# Patient Record
Sex: FEMALE | Race: BLACK OR AFRICAN AMERICAN | NOT HISPANIC OR LATINO
[De-identification: names, ages, dates, MRNs, and addresses within clinical notes are randomized per-mention and may not be internally consistent; named-entity substitution may affect disease eponyms.]

---

## 2018-03-14 ENCOUNTER — APPOINTMENT (OUTPATIENT)
Dept: MRI IMAGING | Facility: IMAGING CENTER | Age: 43
End: 2018-03-14
Payer: MEDICAID

## 2018-03-14 ENCOUNTER — OUTPATIENT (OUTPATIENT)
Dept: OUTPATIENT SERVICES | Facility: HOSPITAL | Age: 43
LOS: 1 days | End: 2018-03-14
Payer: MEDICAID

## 2018-03-14 DIAGNOSIS — Z00.8 ENCOUNTER FOR OTHER GENERAL EXAMINATION: ICD-10-CM

## 2018-03-14 PROCEDURE — 72197 MRI PELVIS W/O & W/DYE: CPT | Mod: 26

## 2018-03-14 PROCEDURE — 74183 MRI ABD W/O CNTR FLWD CNTR: CPT

## 2018-03-14 PROCEDURE — 82565 ASSAY OF CREATININE: CPT

## 2018-03-14 PROCEDURE — 74183 MRI ABD W/O CNTR FLWD CNTR: CPT | Mod: 26

## 2018-03-14 PROCEDURE — A9585: CPT

## 2018-03-14 PROCEDURE — 72197 MRI PELVIS W/O & W/DYE: CPT

## 2020-10-13 ENCOUNTER — EMERGENCY (EMERGENCY)
Facility: HOSPITAL | Age: 45
LOS: 1 days | Discharge: HOME | End: 2020-10-13
Attending: EMERGENCY MEDICINE
Payer: COMMERCIAL

## 2020-10-13 VITALS
HEART RATE: 89 BPM | DIASTOLIC BLOOD PRESSURE: 81 MMHG | OXYGEN SATURATION: 100 % | RESPIRATION RATE: 18 BRPM | HEIGHT: 67 IN | WEIGHT: 179.02 LBS | TEMPERATURE: 98 F | SYSTOLIC BLOOD PRESSURE: 145 MMHG

## 2020-10-13 LAB
ALBUMIN SERPL ELPH-MCNC: 4.4 G/DL — SIGNIFICANT CHANGE UP (ref 3.3–5)
ALP SERPL-CCNC: 55 U/L — SIGNIFICANT CHANGE UP (ref 40–120)
ALT FLD-CCNC: 12 U/L — SIGNIFICANT CHANGE UP (ref 10–45)
ANION GAP SERPL CALC-SCNC: 10 MMOL/L — SIGNIFICANT CHANGE UP (ref 5–17)
AST SERPL-CCNC: 19 U/L — SIGNIFICANT CHANGE UP (ref 10–40)
BASE EXCESS BLDV CALC-SCNC: 2.7 MMOL/L — HIGH (ref -2–2)
BILIRUB SERPL-MCNC: 0.2 MG/DL — SIGNIFICANT CHANGE UP (ref 0.2–1.2)
BLD GP AB SCN SERPL QL: NEGATIVE — SIGNIFICANT CHANGE UP
BUN SERPL-MCNC: 9 MG/DL — SIGNIFICANT CHANGE UP (ref 7–23)
CA-I SERPL-SCNC: 1.23 MMOL/L — SIGNIFICANT CHANGE UP (ref 1.12–1.3)
CALCIUM SERPL-MCNC: 9.4 MG/DL — SIGNIFICANT CHANGE UP (ref 8.4–10.5)
CHLORIDE BLDV-SCNC: 103 MMOL/L — SIGNIFICANT CHANGE UP (ref 96–108)
CHLORIDE SERPL-SCNC: 104 MMOL/L — SIGNIFICANT CHANGE UP (ref 96–108)
CO2 BLDV-SCNC: 29 MMOL/L — SIGNIFICANT CHANGE UP (ref 22–30)
CO2 SERPL-SCNC: 25 MMOL/L — SIGNIFICANT CHANGE UP (ref 22–31)
CREAT SERPL-MCNC: 0.7 MG/DL — SIGNIFICANT CHANGE UP (ref 0.5–1.3)
GAS PNL BLDV: 139 MMOL/L — SIGNIFICANT CHANGE UP (ref 135–145)
GAS PNL BLDV: SIGNIFICANT CHANGE UP
GAS PNL BLDV: SIGNIFICANT CHANGE UP
GLUCOSE BLDV-MCNC: 94 MG/DL — SIGNIFICANT CHANGE UP (ref 70–99)
GLUCOSE SERPL-MCNC: 97 MG/DL — SIGNIFICANT CHANGE UP (ref 70–99)
HCG SERPL-ACNC: <2 MIU/ML — SIGNIFICANT CHANGE UP
HCO3 BLDV-SCNC: 28 MMOL/L — SIGNIFICANT CHANGE UP (ref 21–29)
HCT VFR BLD CALC: 22.7 % — LOW (ref 34.5–45)
HCT VFR BLDA CALC: 20 % — CRITICAL LOW (ref 39–50)
HGB BLD CALC-MCNC: 6.3 G/DL — CRITICAL LOW (ref 11.5–15.5)
HGB BLD-MCNC: 6.2 G/DL — CRITICAL LOW (ref 11.5–15.5)
LACTATE BLDV-MCNC: 0.8 MMOL/L — SIGNIFICANT CHANGE UP (ref 0.7–2)
LIDOCAIN IGE QN: 36 U/L — SIGNIFICANT CHANGE UP (ref 7–60)
MCHC RBC-ENTMCNC: 19.3 PG — LOW (ref 27–34)
MCHC RBC-ENTMCNC: 27.3 GM/DL — LOW (ref 32–36)
MCV RBC AUTO: 70.7 FL — LOW (ref 80–100)
PCO2 BLDV: 49 MMHG — SIGNIFICANT CHANGE UP (ref 35–50)
PH BLDV: 7.37 — SIGNIFICANT CHANGE UP (ref 7.35–7.45)
PLATELET # BLD AUTO: 654 K/UL — HIGH (ref 150–400)
PO2 BLDV: 20 MMHG — LOW (ref 25–45)
POTASSIUM BLDV-SCNC: 3.8 MMOL/L — SIGNIFICANT CHANGE UP (ref 3.5–5.3)
POTASSIUM SERPL-MCNC: 4 MMOL/L — SIGNIFICANT CHANGE UP (ref 3.5–5.3)
POTASSIUM SERPL-SCNC: 4 MMOL/L — SIGNIFICANT CHANGE UP (ref 3.5–5.3)
PROT SERPL-MCNC: 7.3 G/DL — SIGNIFICANT CHANGE UP (ref 6–8.3)
RBC # BLD: 3.13 M/UL — LOW (ref 3.8–5.2)
RBC # BLD: 3.21 M/UL — LOW (ref 3.8–5.2)
RBC # FLD: 22.7 % — HIGH (ref 10.3–14.5)
RETICS #: 42.3 K/UL — SIGNIFICANT CHANGE UP (ref 25–125)
RETICS/RBC NFR: 1.4 % — SIGNIFICANT CHANGE UP (ref 0.5–2.5)
RH IG SCN BLD-IMP: POSITIVE — SIGNIFICANT CHANGE UP
SAO2 % BLDV: 17 % — LOW (ref 67–88)
SODIUM SERPL-SCNC: 139 MMOL/L — SIGNIFICANT CHANGE UP (ref 135–145)
WBC # BLD: 6.73 K/UL — SIGNIFICANT CHANGE UP (ref 3.8–10.5)
WBC # FLD AUTO: 6.73 K/UL — SIGNIFICANT CHANGE UP (ref 3.8–10.5)

## 2020-10-13 PROCEDURE — 99291 CRITICAL CARE FIRST HOUR: CPT

## 2020-10-13 PROCEDURE — 93010 ELECTROCARDIOGRAM REPORT: CPT

## 2020-10-13 RX ORDER — FERROUS SULFATE 325(65) MG
1 TABLET ORAL
Qty: 60 | Refills: 0
Start: 2020-10-13 | End: 2020-11-11

## 2020-10-13 RX ORDER — DOCUSATE SODIUM 100 MG
1 CAPSULE ORAL
Qty: 60 | Refills: 0
Start: 2020-10-13 | End: 2020-11-11

## 2020-10-13 NOTE — ED PROVIDER NOTE - CARE PLAN
Principal Discharge DX:	Anemia, blood loss  Secondary Diagnosis:	Iron deficiency anemia  Secondary Diagnosis:	Uterine fibroid

## 2020-10-13 NOTE — ED PROVIDER NOTE - ATTENDING CONTRIBUTION TO CARE
Pt with h/o iron deficiency anemia and fibroid since at least 2017, attempted holistics in past, presents with acute on chronic anemia (reviewed pt's labs on her [employer] portal), baseline hgb 7 to 9, iron low with high TIBC, MCV 80s, today on 5th day on menses, previous days heavy like cups/bucket of dark blood 3-6 times a day but today with only one episode of minor bleeding.  Pt appears well, nontoxic, clear lung, not tachycardic, nontender abdomen.  Pelvic by PA without gross blood in vault.  Pt deemed stable for transfusion in ED, restart iron with education on its importance, and f/u ob/gyn outpatient.

## 2020-10-13 NOTE — ED PROVIDER NOTE - OBJECTIVE STATEMENT
45y F PMH Uterine Fibroids, PALMIRA (not currently taking taking Iron) p/w low H/H at GYN apt yesterday. Pt was noted to have Hgb ~6s; currently on 5th day of menses - states she has had heavy menses since when she was dx with Fibroids in 2017, current menses consistent with mense hx. Pt states she pours out blood (no clots) usually every hour when she has her menses. Pt reviewed her chart at [facility] and Hgb chronically runs in 7s. Pt asymptomatic at this time - denies HA/vision changes/dizziness/lightheadedness, chest pain/SOB/palpitations, abdominal pain, N/V/D. 45y F PMH Uterine Fibroids, PALMIRA (not currently taking Iron) p/w low H/H at GYN apt yesterday. Pt was noted to have Hgb ~6s; currently on 5th day of menses - states she has had heavy menses since when she was dx with Fibroids in 2017, current menses consistent with menses hx. Pt states she pours out blood (no clots) usually every hour when she has her menses. Pt reviewed her chart at [facility] and Hgb chronically runs in 7s. Pt asymptomatic at this time - denies HA/vision changes/dizziness/lightheadedness, chest pain/SOB/palpitations, abdominal pain, N/V/D.

## 2020-10-13 NOTE — ED PROVIDER NOTE - NSFOLLOWUPINSTRUCTIONS_ED_ALL_ED_FT
You were seen in the Emergency Room for abnormal labs due to low Hemoglobin. You received one unit of packed red blood cells, as your baseline Hemoglobin level runs in the 7s. Your case was discussed with Dr. Larkin's on-call doctor who agrees with plan and wants you to follow up with GYN in the next 1-2 days.  Please follow up with your GYN in the next 1-2 days.  Please follow up with your PCP in the next 1-2 days.  Take Ferrous Sulfate 325mg 2x a day for Iron Deficiency.  Take Colace 100mg 2x a day for constipation.   Rest and drink plenty of fluids. Pain can be managed with Acetaminophen (aka Tylenol) and Ibuprofen (aka Motrin or Advil) over the counter as directed.  Return to the ER for any new or worsening symptoms such as dizziness/lightheadedness/vision changes, palpitations, chest pain, or difficulty breathing. You were seen in the Emergency Room for abnormal labs due to low Hemoglobin. You received one unit of packed red blood cells, as your baseline Hemoglobin level runs in the 7s. Your case was discussed with Dr. Larkin's on-call doctor who agrees with plan and wants you to follow up with GYN in the next 1-2 days.  Please follow up with your GYN in the next 1-2 days.  Please follow up with your PCP in the next 1-2 days.  Take Ferrous Sulfate 325mg 2x a day for Iron Deficiency.  Take Colace 100mg 2x a day for constipation.   Rest and drink plenty of fluids. Pain can be managed with Acetaminophen (aka Tylenol) and Ibuprofen (aka Motrin or Advil) over the counter as directed.  Return to the ER for any new or worsening symptoms such as dizziness/lightheadedness/vision changes, palpitations, chest pain, or difficulty breathing.    Would hold on taking antibiotics unless called for positive urine culture. However after discussing risks of antibiotics if you prefer to start them it is not unreasonable. Follow up with your PCP regarding this also.

## 2020-10-13 NOTE — ED PROVIDER NOTE - PATIENT PORTAL LINK FT
You can access the FollowMyHealth Patient Portal offered by [facility] by registering at the following website: [email or web address]. By joining [employer]’s FollowMyHealth portal, you will also be able to view your health information using other applications (apps) compatible with our system.

## 2020-10-13 NOTE — ED ADULT NURSE NOTE - OBJECTIVE STATEMENT
Pt qdoc in triage- arrived to orange w/ 20G IV in Right AC.     Pt 46 y/o female, AxOx3, presents to ED ambulatory from home complaining of low hgb. PMH of fibroids/ anemia- baseline hgb 7. Pt states she received blood work last week and hgb results as 5.8. Pt denies vaginal bleeding, dizziness, HA SOB, vision changes. Pt is well appearing, speaking full sentences without difficulty. Breathing spontaneous and unlabored with pulse ox >95% on room air. Upon assessment, abdomen soft and nontender, +strong peripheral pulses, moving all extremities without difficulty, lungs clear. Safety and comfort measures initiated- bed placed in lowest position and side rails raised. Pt oriented to call bell system.

## 2020-10-13 NOTE — ED PROVIDER NOTE - RAPID ASSESSMENT
45y F with PMHx of Uterine Fibroids, Anemia and no significant PSHx presents to the ED for Hgb 5.8 found in BW done with OB/GYN, Dr. Larkin, on 10/9/2020. + Did PUS done 10/9/2020 in Dr. Larkin's office. + WILKINSON after climbing 2 flights of stairs. Denies smoking hx, ETOH use, and illicit drug use. Denies dizziness. LMP: 10/8/2020. Denies sexual activities. Taking Vit C, no other meds on a daily basis. Denies prior blood transfusions.     PE: NAD female, WNWD. RRR, S1 and S2 appreciated. Abd NTND, soft. CTAB. No CVA tenderness.     PCP: Dr. Corinna Larkin T: [phone] 45y F with PMHx of Uterine Fibroids, Anemia and no significant PSHx presents to the ED for Hgb 5.8 found in BW done with OB/GYN, Dr. Larkin, on 10/9/2020. + Did PUS done 10/9/2020 in Dr. Larkin's office. + WILKINSON after climbing 2 flights of stairs. Denies smoking hx, ETOH use, and illicit drug use. Denies dizziness. LMP: 10/8/2020. Denies sexual activities. Taking Vit C, no other meds on a daily basis. Denies prior blood transfusions.     PE: NAD female, WNWD. RRR, S1 and S2 appreciated. Abd NTND, soft. Chest clear AP No CVA tenderness.     PCP: Dr. Corinna Larkin T: [phone]

## 2020-10-13 NOTE — ED PROVIDER NOTE - PROGRESS NOTE DETAILS
Spoke with pt's GYN (Dr. Larkin) on-call doctor, Dr. Cintron. Discussed case with him and fact that patient chronically hgb ~7, has hx of fibroids/PALMIRA (not currently taking Iron), pt will receive unit of blood and be d/c with iron supplementation and f/u with GYN/PCP in outpatient setting. Dr. Cintron agrees with plan. MD hilton.   -Benton Cr PA-C Attending Amanda:  PT s/o plan for transfuse 1 unit prbc -->dc home. Evaluated pt. hx and pe performed 46 y/o hx of chronic anemia, found to be anemic by outpt labs which were screening labs in order to be evaluated for fibroid surgery. Told she was anemic and sent to ed. pt states she was asymptomatic from that standpoint. No sob, fatigue, near syncope/syncope, cp. pt had normal menses which she is finishing, scheduled to see ob tomorrow. vitals normal throughout stay. given chronically anemic last 7.6 in march, asymptomatic w/ stable vitals it is reasonable for dc home. Discussed w/ pt who is amenable w/ dc Attending Amanda:  discussed ua w/ pt. reports asymptomatic but then requesting abx. discussed I think this is likely contaminant and discussed r of trx w/ abx vs b. pt now states maybe had some foul smell. not unreasonable to start abx. discussed can wait for cx. shared decision making pt still wanted after discussing r vs b. pen allergy. placed cipro.

## 2020-10-13 NOTE — ED PROVIDER NOTE - PHYSICAL EXAMINATION
IFDEL Cr PA-C see below:  GEN: NAD, awake, eyes open spontaneously  HEENT: NCAT, MMM, Trachea midline, normal conjunctiva, perrl  CHEST/LUNGS: Non-tachypneic, CTAB, bilateral breath sounds  CARDIAC: Non-tachycardic, normal perfusion  ABDOMEN: Soft, NTND, No rebound/guarding  MSK: No edema, no gross deformity of extremities  SKIN: No rashes, no petechiae, no vesicles  PELVIC: Chaperone: Marlyn Patel RN. External genitalia unremarkable, no erythema, no vesicles, no lesions, no vaginal bleeding or DC, cervix w/o erythema, non-friable, no bleeding or DC. Exam was well tolerated.   NEURO: CN grossly intact, normal coordination, no focal motor or sensory deficits  PSYCH: Alert, appropriate, cooperative, with capacity and insight   ---------------------------------------------

## 2020-10-13 NOTE — ED PROVIDER NOTE - CLINICAL SUMMARY MEDICAL DECISION MAKING FREE TEXT BOX
Dr. Ta Note: acute on chronic anemia with known fibroid with heavy bleeding that has resolved today with hgb down from 7.2 --> 6 today and iron studies in past showing low iron but high TIBC and non-compliance with iron...transfuse, re-start iron, outpt gyn for possible myomectomy/hysterectomy.

## 2020-10-14 VITALS
HEART RATE: 86 BPM | RESPIRATION RATE: 16 BRPM | OXYGEN SATURATION: 98 % | TEMPERATURE: 98 F | DIASTOLIC BLOOD PRESSURE: 73 MMHG | SYSTOLIC BLOOD PRESSURE: 125 MMHG

## 2020-10-14 LAB
ANISOCYTOSIS BLD QL: SIGNIFICANT CHANGE UP
APPEARANCE UR: ABNORMAL
BACTERIA # UR AUTO: ABNORMAL
BASOPHILS # BLD AUTO: 0.12 K/UL — SIGNIFICANT CHANGE UP (ref 0–0.2)
BASOPHILS NFR BLD AUTO: 1.8 % — SIGNIFICANT CHANGE UP (ref 0–2)
BILIRUB UR-MCNC: NEGATIVE — SIGNIFICANT CHANGE UP
COLOR SPEC: SIGNIFICANT CHANGE UP
DIFF PNL FLD: NEGATIVE — SIGNIFICANT CHANGE UP
EOSINOPHIL # BLD AUTO: 0.18 K/UL — SIGNIFICANT CHANGE UP (ref 0–0.5)
EOSINOPHIL NFR BLD AUTO: 2.7 % — SIGNIFICANT CHANGE UP (ref 0–6)
EPI CELLS # UR: 25 /HPF — HIGH
FOLATE RBC-MCNC: 1549 NG/ML — HIGH (ref 499–1504)
GIANT PLATELETS BLD QL SMEAR: PRESENT — SIGNIFICANT CHANGE UP
GLUCOSE UR QL: NEGATIVE — SIGNIFICANT CHANGE UP
HAPTOGLOB SERPL-MCNC: 169 MG/DL — SIGNIFICANT CHANGE UP (ref 34–200)
HCT VFR BLD CALC: 20.6 % — CRITICAL LOW (ref 34.5–45)
HYALINE CASTS # UR AUTO: 3 /LPF — HIGH (ref 0–2)
HYPOCHROMIA BLD QL: SIGNIFICANT CHANGE UP
IRON SATN MFR SERPL: 12 UG/DL — LOW (ref 30–160)
IRON SATN MFR SERPL: 3 % — LOW (ref 14–50)
KETONES UR-MCNC: NEGATIVE — SIGNIFICANT CHANGE UP
LEUKOCYTE ESTERASE UR-ACNC: ABNORMAL
LYMPHOCYTES # BLD AUTO: 1.92 K/UL — SIGNIFICANT CHANGE UP (ref 1–3.3)
LYMPHOCYTES # BLD AUTO: 28.5 % — SIGNIFICANT CHANGE UP (ref 13–44)
MANUAL SMEAR VERIFICATION: SIGNIFICANT CHANGE UP
MICROCYTES BLD QL: SLIGHT — SIGNIFICANT CHANGE UP
MONOCYTES # BLD AUTO: 0.18 K/UL — SIGNIFICANT CHANGE UP (ref 0–0.9)
MONOCYTES NFR BLD AUTO: 2.7 % — SIGNIFICANT CHANGE UP (ref 2–14)
NEUTROPHILS # BLD AUTO: 4.33 K/UL — SIGNIFICANT CHANGE UP (ref 1.8–7.4)
NEUTROPHILS NFR BLD AUTO: 64.3 % — SIGNIFICANT CHANGE UP (ref 43–77)
NITRITE UR-MCNC: NEGATIVE — SIGNIFICANT CHANGE UP
NRBC # BLD: 1 /100 — HIGH (ref 0–0)
OVALOCYTES BLD QL SMEAR: SLIGHT — SIGNIFICANT CHANGE UP
PH UR: 7 — SIGNIFICANT CHANGE UP (ref 5–8)
PLAT MORPH BLD: NORMAL — SIGNIFICANT CHANGE UP
POIKILOCYTOSIS BLD QL AUTO: SIGNIFICANT CHANGE UP
PROT UR-MCNC: ABNORMAL
RBC BLD AUTO: ABNORMAL
RBC CASTS # UR COMP ASSIST: 1 /HPF — SIGNIFICANT CHANGE UP (ref 0–4)
SARS-COV-2 RNA SPEC QL NAA+PROBE: SIGNIFICANT CHANGE UP
SCHISTOCYTES BLD QL AUTO: SLIGHT — SIGNIFICANT CHANGE UP
SMUDGE CELLS # BLD: PRESENT — SIGNIFICANT CHANGE UP
SP GR SPEC: 1.02 — SIGNIFICANT CHANGE UP (ref 1.01–1.02)
TIBC SERPL-MCNC: 417 UG/DL — SIGNIFICANT CHANGE UP (ref 220–430)
UIBC SERPL-MCNC: 405 UG/DL — HIGH (ref 110–370)
UROBILINOGEN FLD QL: NEGATIVE — SIGNIFICANT CHANGE UP
VIT B12 SERPL-MCNC: 981 PG/ML — SIGNIFICANT CHANGE UP (ref 232–1245)
WBC UR QL: 10 /HPF — HIGH (ref 0–5)

## 2020-10-14 PROCEDURE — 82607 VITAMIN B-12: CPT

## 2020-10-14 PROCEDURE — 84702 CHORIONIC GONADOTROPIN TEST: CPT

## 2020-10-14 PROCEDURE — 83540 ASSAY OF IRON: CPT

## 2020-10-14 PROCEDURE — 86923 COMPATIBILITY TEST ELECTRIC: CPT

## 2020-10-14 PROCEDURE — 82435 ASSAY OF BLOOD CHLORIDE: CPT

## 2020-10-14 PROCEDURE — 85014 HEMATOCRIT: CPT

## 2020-10-14 PROCEDURE — 84132 ASSAY OF SERUM POTASSIUM: CPT

## 2020-10-14 PROCEDURE — 99291 CRITICAL CARE FIRST HOUR: CPT | Mod: 25

## 2020-10-14 PROCEDURE — 87635 SARS-COV-2 COVID-19 AMP PRB: CPT

## 2020-10-14 PROCEDURE — 80053 COMPREHEN METABOLIC PANEL: CPT

## 2020-10-14 PROCEDURE — 82330 ASSAY OF CALCIUM: CPT

## 2020-10-14 PROCEDURE — 83550 IRON BINDING TEST: CPT

## 2020-10-14 PROCEDURE — 87086 URINE CULTURE/COLONY COUNT: CPT

## 2020-10-14 PROCEDURE — 84295 ASSAY OF SERUM SODIUM: CPT

## 2020-10-14 PROCEDURE — 86850 RBC ANTIBODY SCREEN: CPT

## 2020-10-14 PROCEDURE — 85045 AUTOMATED RETICULOCYTE COUNT: CPT

## 2020-10-14 PROCEDURE — 83010 ASSAY OF HAPTOGLOBIN QUANT: CPT

## 2020-10-14 PROCEDURE — 82747 ASSAY OF FOLIC ACID RBC: CPT

## 2020-10-14 PROCEDURE — 85025 COMPLETE CBC W/AUTO DIFF WBC: CPT

## 2020-10-14 PROCEDURE — 82947 ASSAY GLUCOSE BLOOD QUANT: CPT

## 2020-10-14 PROCEDURE — 86900 BLOOD TYPING SEROLOGIC ABO: CPT

## 2020-10-14 PROCEDURE — 81001 URINALYSIS AUTO W/SCOPE: CPT

## 2020-10-14 PROCEDURE — P9016: CPT

## 2020-10-14 PROCEDURE — 83605 ASSAY OF LACTIC ACID: CPT

## 2020-10-14 PROCEDURE — 93005 ELECTROCARDIOGRAM TRACING: CPT

## 2020-10-14 PROCEDURE — 86901 BLOOD TYPING SEROLOGIC RH(D): CPT

## 2020-10-14 PROCEDURE — 36430 TRANSFUSION BLD/BLD COMPNT: CPT

## 2020-10-14 PROCEDURE — 82803 BLOOD GASES ANY COMBINATION: CPT

## 2020-10-14 PROCEDURE — 83690 ASSAY OF LIPASE: CPT

## 2020-10-14 PROCEDURE — 85018 HEMOGLOBIN: CPT

## 2020-10-14 RX ORDER — CIPROFLOXACIN LACTATE 400MG/40ML
1 VIAL (ML) INTRAVENOUS
Qty: 14 | Refills: 0
Start: 2020-10-14 | End: 2020-10-20

## 2020-10-15 LAB
CULTURE RESULTS: SIGNIFICANT CHANGE UP
SPECIMEN SOURCE: SIGNIFICANT CHANGE UP

## 2023-05-12 NOTE — ED ADULT NURSE REASSESSMENT NOTE - NS ED NURSE REASSESS COMMENT FT1
PRBC's given. Consent in chart. Risks and benefits explained to patient. Patient verbalized understanding of risks and benefits. Patient aware of possible side effects. Vital signs stable. Second RN at bedside for confirmation. 70